# Patient Record
(demographics unavailable — no encounter records)

---

## 2024-11-13 NOTE — PHYSICAL EXAM
[FreeTextEntry3] : around 10  1-5 mm skin colored umbilicated papules on bilateral knees  verrucous papule son left palm, right palm, right sole, right ankle

## 2024-11-13 NOTE — ASSESSMENT
[FreeTextEntry1] :  #Molluscum contagiosum Chronic, flaring, lower extremities  - Reviewed benign viral etiology and natural course with most children resolving lesions within 2 yrs, anticipatory guidance provided. Discussed various treatment options including active nonintervention (observation), cryotherapy, cantharidin, zymaderm OTC, topical retinoids topicals for irritant/immune stimulation effect.  - Counseled that they may become inflamed before they resolve (beginning of the end sign), and that as they are a pox virus they may leave a small pinpoint scar  - Avoid scratching/shaving due to risk of autoinoculation  - Avoid sharing clothes/towels - Will tx lesions under *** as they are symptomatic but will observe lesions on face --ordered ycanth to vivo for f/u  #Verruca vulgaris x1, 4  - The patient was informed of the pathophysiology of their lesions and their treatment course with liquid nitrogen (cryosurgery). Side effects include blister formation, hypopigmentation, and scarring. Patient was verbally consented and the lesions identified above were treated with liquid nitrogen freeze, thaw, freeze x 10 seconds each cycle x 2. The patient tolerated the procedure well. Wound care instructions, care of a blister with vaseline, signs and symptoms of infection were discussed in full. The patient denies any questions at this time. - 1 week after treatment in the office, start home wart remover treatment as below: Apply 17% or 40% salicylic acid (Compound W, Wart Stick, DuoFilm, etc) to the affected area and let dry Then cover with duct tape, leave on for 24 hours. Then remove tape, wash area, then re-start. Take 1-3 days off if pain or severe irritation occurs. - Recommend HPV vaccination  #nevus, left abdomen reassured benign

## 2024-11-13 NOTE — HISTORY OF PRESENT ILLNESS
[FreeTextEntry1] : spots [de-identified] : Verruca on hands and feet spot check molluscum dermatitis

## 2024-11-22 NOTE — ASSESSMENT
[FreeTextEntry1] : #Molluscum contagiosum Chronic, flaring   - Reviewed benign viral etiology and natural course with most children resolving lesions within 2 yrs, anticipatory guidance provided. Discussed various treatment options including active nonintervention (observation), cryotherapy, cantharidin, zymaderm OTC, topical retinoids topicals for irritant/immune stimulation effect.   - Counseled that they may become inflamed before they resolve (beginning of the end sign), and that as they are a pox virus they may leave a small pinpoint scar   - Avoid scratching/shaving due to risk of autoinoculation   - Avoid sharing clothes/towels  - 10 lesions treated today. RTC 1 month if lesions remain  - For lesions on knees, treated with Ycanth using Qtip.  The patient tolerated the procedure well.  Wound care instructions including when to remove the acid (in 4 hours) from the skin were reviewed, care of a blister, signs and symptoms of infection were discussed in full. The patient denies any questions at this time. - Plan to treat additional lesions with Ycanth NV if patient does not develop significant dyschromia at site of treatment.  #Verruca vulgaris R plantar foot - S/p cryo at last visit, slightly blistered today - Advised to stop compound W for 1 week then restart --RTC for cryotherapy

## 2024-11-22 NOTE — HISTORY OF PRESENT ILLNESS
[FreeTextEntry1] : FU: molluscum [de-identified] : DARRICK SULLIVAN is a 4 year old who is presenting for evaluation of:  #Verruca on hands and feet - S/p cryo at last visit. Mom has been applying sal acid day after cryo. Site on R plantar foot is mildly tender  #Molluscum dermatitis, on knees. Here for Ycanth

## 2024-12-06 NOTE — HISTORY OF PRESENT ILLNESS
[FreeTextEntry1] : FU: molluscum [de-identified] : DARRICK SULLIVAN is a 4 year old who is presenting for evaluation of:  #Verruca on hands and feet - S/p cryo treatment #1 at last visit. Mom has been applying sal acid day after cryo. Site on R plantar foot is present. Mother took break from applying compound W before this visit.  #Molluscum dermatitis, on knees, legs, abdomen. Here for Ycanth

## 2024-12-06 NOTE — PHYSICAL EXAM
[FreeTextEntry3] : around 10  1-5 mm skin colored umbilicated papules on bilateral knees  verrucous papule son right palm, right sole

## 2024-12-06 NOTE — ASSESSMENT
[FreeTextEntry1] : #Molluscum contagiosum Chronic, flaring   - Reviewed benign viral etiology and natural course with most children resolving lesions within 2 yrs, anticipatory guidance provided. Discussed various treatment options including active nonintervention (observation), cryotherapy, cantharidin, zymaderm OTC, topical retinoids topicals for irritant/immune stimulation effect.   - Counseled that they may become inflamed before they resolve (beginning of the end sign), and that as they are a pox virus they may leave a small pinpoint scar   - Avoid scratching/shaving due to risk of autoinoculation   - Avoid sharing clothes/towels  - 12 lesions treated today. RTC 1 month if lesions remain  - For lesions on knees, treated with Ycanth using Qtip.  The patient tolerated the procedure well.  Wound care instructions including when to remove the acid (in 4 hours) from the skin were reviewed, care of a blister, signs and symptoms of infection were discussed in full. The patient denies any questions at this time. - Plan to treat additional lesions with Ycanth NV if patient does not develop significant dyschromia at site of treatment.  #Verruca vulgaris R plantar foot. Treatment #2 on 2 lesions (right sole, right palm) - S/p cryo at last visit, slightly blistered today - Advised to stop compound W for 1 week then restart --RTC for cryotherapy

## 2025-01-03 NOTE — HISTORY OF PRESENT ILLNESS
[FreeTextEntry1] : FU: molluscum [de-identified] : DARRICK SULLIVAN is a 5-year-old who is presenting for evaluation of:  #Verruca on foot  - S/p cryo treatment #2 at last visit. Has been applying sal acid day after cryo. Site on R plantar foot is present.    #Molluscum dermatitis, on knees, legs, abdomen. Here for Ycanth treatment #2

## 2025-01-03 NOTE — ASSESSMENT
[FreeTextEntry1] : #Molluscum contagiosum Chronic, flaring   - Reviewed benign viral etiology and natural course with most children resolving lesions within 2 yrs, anticipatory guidance provided. Discussed various treatment options including active nonintervention (observation), cryotherapy, cantharidin, zymaderm OTC, topical retinoids topicals for irritant/immune stimulation effect.   - Counseled that they may become inflamed before they resolve (beginning of the end sign), and that as they are a pox virus they may leave a small pinpoint scar   - Avoid scratching/shaving due to risk of autoinoculation   - Avoid sharing clothes/towels  - 9 lesions treated today. RTC 1 month if lesions remain  - For lesions on arms and legs, treated with Ycanth using Qtip (LOT [id number] EXP 10/2025).  The patient tolerated the procedure well.  Wound care instructions including when to remove the acid (in 4 hours) from the skin were reviewed, care of a blister, signs and symptoms of infection were discussed in full. The patient denies any questions at this time. - Advised to stop compound W for 1 week then restart  - Plan to treat additional lesions with Ycanth NV if patient does not develop significant dyschromia at site of treatment.  #Verruca vulgaris R plantar foot. Treatment #3 today   Procedure note: Cryotherapy  Verbal consent obtained. The risks/benefits/alternatives discussed including but not limited to risk of pain, inflammatory and blistering reaction, infection, risk of permanent scar and/or dyspigmentation, recurrence, possible lack of efficacy and need for repeat treatments. Sit confirmed. 1 lesion(s) treated with cryotherapy: liquid nitrogen x2 rapid freeze-slow thaw cycles. Discussed wound care instructions. Patient tolerated well with no immediate complications.  --RTC for cryotherapy

## 2025-01-03 NOTE — PHYSICAL EXAM
[FreeTextEntry3] : around 9 1-5 mm skin colored umbilicated papules on bilateral knees and legs with surrounding erythema   verrucous papule on right plantar foot

## 2025-01-24 NOTE — PHYSICAL EXAM
[FreeTextEntry3] : around 9 1-5 mm skin colored umbilicated papules on bilateral knees and legs with surrounding erythema   pinpoint verruca, interval improvement on right sole of foot

## 2025-01-24 NOTE — HISTORY OF PRESENT ILLNESS
[FreeTextEntry1] : FU: molluscum [de-identified] : DARRICK SULLIVAN is a 5-year-old who is presenting for evaluation of molluscum and verruca

## 2025-01-24 NOTE — ASSESSMENT
[FreeTextEntry1] : #Molluscum contagiosum Chronic, flaring   TREATMENT4 - Reviewed benign viral etiology and natural course with most children resolving lesions within 2 yrs, anticipatory guidance provided. Discussed various treatment options including active nonintervention (observation), cryotherapy, cantharidin, zymaderm OTC, topical retinoids topicals for irritant/immune stimulation effect.   - Counseled that they may become inflamed before they resolve (beginning of the end sign), and that as they are a pox virus they may leave a small pinpoint scar   - Avoid scratching/shaving due to risk of autoinoculation   - Avoid sharing clothes/towels  - 6 lesions treated today. RTC 1 month if lesions remain  - For lesions on arms and legs, treated with Ycanth using Qtip (LOT [id number] EXP Oct 2025).  The patient tolerated the procedure well.  Wound care instructions including when to remove the acid (in 4 hours) from the skin were reviewed, care of a blister, signs and symptoms of infection were discussed in full. The patient denies any questions at this time. - Advised to stop compound W for 1 week then restart  - Plan to treat additional lesions with Ycanth NV if patient does not develop significant dyschromia at site of treatment.   #Verruca vulgaris R plantar foot. Treatment #4 today  Procedure note: Cryotherapy  Verbal consent obtained. The risks/benefits/alternatives discussed including but not limited to risk of pain, inflammatory and blistering reaction, infection, risk of permanent scar and/or dyspigmentation, recurrence, possible lack of efficacy and need for repeat treatments. Sit confirmed. 1 lesion(s) treated with cryotherapy: liquid nitrogen x2 rapid freeze-slow thaw cycles. Discussed wound care instructions. Patient tolerated well with no immediate complications. --RTC for cryotherapy

## 2025-02-28 NOTE — ASSESSMENT
[FreeTextEntry1] : #Molluscum contagiosum Chronic, flaring   TREATMENT - Reviewed benign viral etiology and natural course with most children resolving lesions within 2 yrs, anticipatory guidance provided. Discussed various treatment options including active nonintervention (observation), cryotherapy, cantharidin, zymaderm OTC, topical retinoids topicals for irritant/immune stimulation effect.   - Counseled that they may become inflamed before they resolve (beginning of the end sign), and that as they are a pox virus they may leave a small pinpoint scar   - Avoid scratching/shaving due to risk of autoinoculation   - Avoid sharing clothes/towels  - 2 lesions treated today. RTC 1 month if lesions remain LOT [id number] Exp Oct 2025  - For lesions on arms and legs, treated with Ycanth using Qtip.  The patient tolerated the procedure well.  Wound care instructions including when to remove the acid (in 4 hours) from the skin were reviewed, care of a blister, signs and symptoms of infection were discussed in full. The patient denies any questions at this time. - Advised to stop compound W for 1 week then restart  - Plan to treat additional lesions with Ycanth NV if patient does not develop significant dyschromia at site of treatment.   #Verruca vulgaris R plantar foot. s/p 4 treatments with lns Procedure note: Cryotherapy  resolved  RTC PRn

## 2025-02-28 NOTE — HISTORY OF PRESENT ILLNESS
[FreeTextEntry1] : FU: molluscum [de-identified] : DARRICK SULLIVAN is a 5-year-old who is presenting for evaluation of molluscum and verruca